# Patient Record
Sex: MALE | Race: WHITE | Employment: FULL TIME | URBAN - METROPOLITAN AREA
[De-identification: names, ages, dates, MRNs, and addresses within clinical notes are randomized per-mention and may not be internally consistent; named-entity substitution may affect disease eponyms.]

---

## 2019-09-27 ENCOUNTER — OFFICE VISIT (OUTPATIENT)
Dept: ORTHOPEDIC SURGERY | Age: 49
End: 2019-09-27
Payer: COMMERCIAL

## 2019-09-27 VITALS — HEIGHT: 70 IN | WEIGHT: 225 LBS | BODY MASS INDEX: 32.21 KG/M2

## 2019-09-27 DIAGNOSIS — M19.031 ARTHRITIS OF WRIST, RIGHT: ICD-10-CM

## 2019-09-27 DIAGNOSIS — M25.531 WRIST PAIN, RIGHT: Primary | ICD-10-CM

## 2019-09-27 PROCEDURE — 20605 DRAIN/INJ JOINT/BURSA W/O US: CPT | Performed by: ORTHOPAEDIC SURGERY

## 2019-09-27 PROCEDURE — L3908 WHO COCK-UP NONMOLDE PRE OTS: HCPCS | Performed by: ORTHOPAEDIC SURGERY

## 2019-09-27 PROCEDURE — 99203 OFFICE O/P NEW LOW 30 MIN: CPT | Performed by: ORTHOPAEDIC SURGERY

## 2019-09-27 RX ORDER — BETAMETHASONE SODIUM PHOSPHATE AND BETAMETHASONE ACETATE 3; 3 MG/ML; MG/ML
12 INJECTION, SUSPENSION INTRA-ARTICULAR; INTRALESIONAL; INTRAMUSCULAR; SOFT TISSUE ONCE
Status: COMPLETED | OUTPATIENT
Start: 2019-09-27 | End: 2019-09-27

## 2019-09-27 RX ADMIN — BETAMETHASONE SODIUM PHOSPHATE AND BETAMETHASONE ACETATE 12 MG: 3; 3 INJECTION, SUSPENSION INTRA-ARTICULAR; INTRALESIONAL; INTRAMUSCULAR; SOFT TISSUE at 09:08

## 2020-08-04 ENCOUNTER — OFFICE VISIT (OUTPATIENT)
Dept: ORTHOPEDIC SURGERY | Age: 50
End: 2020-08-04
Payer: COMMERCIAL

## 2020-08-04 VITALS — HEIGHT: 70 IN | BODY MASS INDEX: 32.93 KG/M2 | RESPIRATION RATE: 12 BRPM | TEMPERATURE: 97.3 F | WEIGHT: 230 LBS

## 2020-08-04 PROCEDURE — 99203 OFFICE O/P NEW LOW 30 MIN: CPT | Performed by: PHYSICIAN ASSISTANT

## 2020-08-04 NOTE — PROGRESS NOTES
Other (Comment)(Sudden movements)  Limiting Behavior: Yes  Relieving Factors: Rest  Result of Injury: Yes(Hit head on side of pool)  Work-Related Injury: No  Are there other pain locations you wish to document?: No      Associated signs and symptoms:   Neurogenic bowel or bladder symptoms:  no   Perceived weakness:  no   Difficulty walking:  no    Recent Imaging (within past one year)   Xrays: no   MRI or CT of spine: no    Current/Past Treatment:   · Physical Therapy:  none  · Chiropractic:  none  · Injection:  none  · Medications:   NSAIDS:  yes   Muscle relaxer:  none   Steriods:  none   Neuropathic medications:  none   Opioids:  none  · Previous surgery:  no  · Previous surgical consult:  no  · Other:  · Infection control  · Tested positive for MRSA in past 12 months:  no  · Tested positive for MSSA \"staph infection\" in past 12 months: no  · Tested positive for VRE (Vancomycin Resistant Enterococci) in past 12 months:   no  · Currently on any antibiotics for an infection: no  · Anticoagulants:  · On a blood thinner:  no   · Any history of bleeding disorder: no   · MRI Contraindication: no   · Previous Pain Management: no   · Goal for treatment: Reduce pain / improve neck motion  · How long can you stand? Varies    Sit? Varies       Walk? Varies      Past medical, surgical, social and family history reviewed with the patient. Past Medical History:   Past Medical History:   Diagnosis Date    Seasonal allergies       Past Surgical History:     Past Surgical History:   Procedure Laterality Date    TONSILLECTOMY      TOOTH EXTRACTION  2010    VARICOSE VEIN SURGERY  2007     Current Medications:     Current Outpatient Medications:     Cetirizine HCl (ZYRTEC PO), Take  by mouth as needed. , Disp: , Rfl:   Allergies:  Patient has no known allergies. Social History:    reports that he has been smoking. He has been smoking about 1.00 pack per day.  He has never used smokeless tobacco. He reports current alcohol use of about 7.0 standard drinks of alcohol per week. He reports that he does not use drugs. Family History:   Family History   Problem Relation Age of Onset    High Blood Pressure Father        REVIEW OF SYSTEMS: ROS - 14 point    Constitutional: No fevers, chills, night sweats, unexplained weight loss  Eye: No vision changes or diplopia  ENT: No nasal congestion, postnasal drip or sore throat. No tinnitus  Respiratory: No cough or SOB  CV: No chest pain or palpitations  GI: No nausea, abdominal pain, stool changes  : No dysuria or hematuria  Skin: No new or changing skin lesions, no rashes  MSK: No joint swelling, morning stiffness, unusual joint pain, + neck pain   Neurological: No headache, confusion, syncope  Psychiatric: No excessive anxiety or depression  Endocrine: No polyuria or polydipsia  Hematologic: No lymph node enlargement or excessive bleeding  Immunologic:No history of immune deficiency or immunomodulating drugs           PHYSICAL EXAM:    Vitals: Temperature 97.3 °F (36.3 °C), resp. rate 12, height 5' 10\" (1.778 m), weight 230 lb (104.3 kg). GENERAL EXAM:  · General Apparence: Patient is adequately groomed with no evidence of malnutrition. · Psychiatric: Orientation: The patient is oriented to time, place and person. The patient's mood and affect are appropriate   · Vascular: Examination reveals no swelling and palpation reveals no tenderness in upper or lower extremities. Good capillary refill. · The lymphatic examination of the neck, axillae and groin reveals all areas to be without enlargement or induration   Sensation is intact without deficit in the upper and lower extremities to light touch and pinprick  · Coordination of the upper and lower extremities are normal.    CERVICAL EXAMINATION:  · Inspection: Local inspection shows no step-off or bruising. Cervical alignment is normal. No instability is noted.   · Palpation and Percussion: No evidence of tenderness at the midline. Paraspinal tenderness is not present. There is no paraspinal spasm. · Range of Motion:  limited by 50% in all planes due to pain   · Strength: 5/5 bilateral upper extremities  · Special Tests:   Spurling's and Silveira's are negative bilaterally. Tejeda and Impingement tests are negative bilaterally. · Skin:There are no rashes, ulcerations or lesions. · Reflexes: Bilaterally triceps, biceps and brachioradialis are 1+. Clonus absent bilaterally at the feet. No pathological reflexes are noted. · Gait & station:  normal, patient ambulates without assistance and no ataxia  · Additional Examinations:  · RIGHT UPPER EXTREMITY:  Inspection/examination of the right upper extremity does not show any tenderness, deformity or injury. Range of motion is normal and pain-free. There is no gross instability. There are no rashes, ulcerations or lesions. Strength and tone are normal. No atrophy or abnormal movements are noted. · LEFT UPPER EXTREMITY: Inspection/examination of the left upper extremity does not show any tenderness, deformity or injury. Range of motion is normal and pain-free. There is no gross instability. There are no rashes, ulcerations or lesions. Strength and tone are normal. No atrophy or abnormal movements are noted. LUMBAR/SACRAL EXAMINATION:  · Inspection: Local inspection shows no step-off or bruising. Lumbar alignment is normal. No instability is noted. · Palpation:   No evidence of tenderness at the midline. Lumbar paraspinal tenderness No tenderness to palpation of the lumbar paraspinals  Bursal tenderness No tenderness bilaterally  There is no paraspinal spasm. · Range of Motion: pain-free ROM  · Strength:   Strength testing is 5/5 in all muscle groups tested. · Special Tests:   Straight leg raise and crossed SLR negative. Jacob's testing is negative bilaterally. FADIR's testing is negative bilaterally. Slump test negative.  Bowstring test negative  · Skin: There are no rashes, ulcerations or lesions. · Reflexes: Reflexes are symmetrically 1+ at the patellar and ankle tendons. Clonus absent bilaterally at the feet. · Gait & station: normal, patient ambulates without assistance and no ataxia  · Additional Examinations:  · RIGHT LOWER EXTREMITY: Inspection/examination of the right lower extremity does not show any tenderness, deformity or injury. Range of motion is unremarkable. There is no gross instability. There are no rashes, ulcerations or lesions. Strength and tone are normal. No atrophy or abnormal movements are noted. · LEFT LOWER EXTREMITY:  Inspection/examination of the left lower extremity does not show any tenderness, deformity or injury. Range of motion is unremarkable. There is no gross instability. There are no rashes, ulcerations or lesions. Strength and tone are normal. No atrophy or abnormal movements are noted. Diagnostic Testing:    Xrays:   AP and lateral of the cervical spine taken today in the office show loss of the normal lordosis of the cervical spine. MRI or CT:  None     EMG:  None     No results found for this or any previous visit. Impression (Medical Decision Making):       1. Cervical sprain, initial encounter    2. Spondylosis without myelopathy or radiculopathy, cervical region    3. Neck pain        Plan (Medical Decision Making):    I discussed the diagnosis and the treatment options with Eulogio Moon today. In Summary:  The various treatment options were outlined and discussed with Eulogio Moon including:  Conservative care options: physical therapy, ice, medications, bracing, and activity modification. The indications for therapeutic injections. The indications for additional imaging/laboratory studies. The indications for (possible future) interventions. After considering the various options discussed, Eulogio Moon elected to pursue a course of treatment that includes the followin.  Medications: Continue [address]  Partner of [facility] ([facility])         This dictation was performed with a verbal recognition program [employer]) and it was checked for errors. It is possible that there are still dictated errors within this office note. If so, please bring any errors to my attention for an addendum. All efforts were made to ensure that this office note is accurate.

## 2020-09-01 ENCOUNTER — OFFICE VISIT (OUTPATIENT)
Dept: ORTHOPEDIC SURGERY | Age: 50
End: 2020-09-01
Payer: COMMERCIAL

## 2020-09-01 VITALS — RESPIRATION RATE: 12 BRPM | HEIGHT: 70 IN | TEMPERATURE: 98.4 F | WEIGHT: 229.94 LBS | BODY MASS INDEX: 32.92 KG/M2

## 2020-09-01 PROCEDURE — 99213 OFFICE O/P EST LOW 20 MIN: CPT | Performed by: PHYSICIAN ASSISTANT

## 2020-09-01 NOTE — PROGRESS NOTES
Follow up: Garnet Eisenmenger  1970  [id number]         Chief Complaint   Patient presents with    Neck Pain     CK CSP HEP         HISTORY OF PRESENT ILLNESS:  Mr. Sherrell Broderick is a 45 y.o. male returns for a follow up visit for multiple medical problems. His current presenting problems are   1. Cervical sprain, subsequent encounter    2. Spondylosis without myelopathy or radiculopathy, cervical region    . As per information/history obtained from the PADT(patient assessment and documentation tool) - He complains of pain in the neck with radiation to the neck He rates the pain 1/10 and describes it as aching, throbbing. Pain is made worse by: weather. He denies side effects from the current pain regimen. Patient reports that since the last follow up visit the physical functioning is better, family/social relationships are better, mood is better and sleep patterns are better, and that the overall functioning is better. Patient denies neurological bowel or bladder. The patient presents today in follow-up after first being seen as a new patient on 8/4/2020 when he was provided with a home exercise program that he has completed over the last 4 weeks. The patient describes that overall he is doing better however on last Friday he describes that he had a particularly bad day but he does equate this to the rain and weather changes. The patient did take ibuprofen that day and describes that it did help with the pain. The patient describes that rest and stretching helps with his pain. The patient will have headaches when the pain comes on but today he feels well. Associated signs and symptoms:   Neurogenic bowel or bladder symptoms:  no   Perceived weakness:  no   Difficulty walking:  no            Past medical, surgical, social and family history reviewed with the patient.      Past Medical History:   Past Medical History:   Diagnosis Date    Seasonal allergies       Past Surgical History:     Past Surgical History:   Procedure Laterality Date    TONSILLECTOMY      TOOTH EXTRACTION  2010    VARICOSE VEIN SURGERY  2007     Current Medications:     Current Outpatient Medications:     Cetirizine HCl (ZYRTEC PO), Take  by mouth as needed. , Disp: , Rfl:   Allergies:  Patient has no known allergies. Social History:    reports that he has been smoking. He has been smoking about 1.00 pack per day. He has never used smokeless tobacco. He reports current alcohol use of about 7.0 standard drinks of alcohol per week. He reports that he does not use drugs. Family History:   Family History   Problem Relation Age of Onset    High Blood Pressure Father        REVIEW OF SYSTEMS:   CONSTITUTIONAL: Denies unexplained weight loss, fevers, chills or fatigue  NEUROLOGICAL: Denies unsteady gait or progressive weakness  MUSCULOSKELETAL: Denies joint swelling or redness  GI: Denies nausea, vomiting, diarrhea   : Denies bowel or bladder issues       PHYSICAL EXAM:    Vitals: Temperature 98.4 °F (36.9 °C), resp. rate 12, height 5' 10\" (1.778 m), weight 229 lb 15 oz (104.3 kg). GENERAL EXAM:  · General Apparence: Patient is adequately groomed with no evidence of malnutrition. · Psychiatric: Orientation: The patient is oriented to time, place and person. The patient's mood and affect are appropriate   · Vascular: Examination reveals no swelling and palpation reveals no tenderness in upper or lower extremities. Good capillary refill. · The lymphatic examination of the neck, axillae and groin reveals all areas to be without enlargement or induration  · Sensation is intact without deficit in the upper and lower extremities to light touch and pinprick  · Coordination of the upper and lower extremities are normal.    CERVICAL EXAMINATION:  · Inspection: Local inspection shows no step-off or bruising. Cervical alignment is normal. No instability is noted. · Palpation and Percussion: No evidence of tenderness at the midline. Paraspinal tenderness is present. There is no paraspinal spasm. Skin:There are no rashes, ulcerations or lesions  · Range of Motion:  limited by 25% in all planes due to pain   · Strength: 5/5 bilateral upper extremities  · Special Tests:   Spurling's and Silveira's are negative bilaterally. Tejeda and Impingement tests are negative bilaterally. · Skin:There are no rashes, ulcerations or lesions in right & left upper extremities. · Reflexes: Bilaterally triceps, biceps and brachioradialis are 1+. Clonus absent bilaterally at the feet. No pathological reflexes are noted. · Gait & station: normal, patient ambulates without assistance and no ataxia  · Additional Examinations:  · RIGHT UPPER EXTREMITY:  Inspection/examination of the right upper extremity does not show any tenderness, deformity or injury. Range of motion is unremarkable and pain-free. There is no gross instability. There are no rashes, ulcerations or lesions. Strength and tone are normal. No atrophy or abnormal movements are noted. · LEFT UPPER EXTREMITY: Inspection/examination of the left upper extremity does not show any tenderness, deformity or injury. Range of motion is unremarkable and pain-free. There is no gross instability. There are no rashes, ulcerations or lesions. Strength and tone are normal. No atrophy or abnormal movements are noted. · RIGHT LOWER EXTREMITY: Inspection/examination of the right lower extremity does not show any tenderness, deformity or injury. Range of motion is normal and pain-free. There is no gross instability. There are no rashes, ulcerations or lesions. Strength and tone are normal. No atrophy or abnormal movements are noted. · LEFT LOWER EXTREMITY:  Inspection/examination of the left lower extremity does not show any tenderness, deformity or injury. Range of motion is normal and pain-free. There is no gross instability. There are no rashes, ulcerations or lesions.   Strength and tone are normal. No atrophy or abnormal movements are noted. Diagnostic Testing:    No new diagnostics. No results found for this or any previous visit. Impression:       1. Cervical sprain, subsequent encounter    2. Spondylosis without myelopathy or radiculopathy, cervical region        Plan:  Clinical Course: Above diagnoses are improving     I discussed the diagnosis and the treatment options with Della Appiah today. In Summary:  The various treatment options were outlined and discussed with Della Appiah including:  Conservative care options: physical therapy, ice, medications, bracing, and activity modification. The indications for therapeutic injections. The indications for additional imaging/laboratory studies. The indications for (possible future) interventions. After considering the various options discussed, Della Appiah elected to pursue a course of treatment that includes the followin. Medications:  Continue anti-inflammatories with appropriate GI Precautions including to stop if develop dark tarry stools or GI upset and to take with food. 2. PT:  Encouraged to continue with Home exercise program. The patient will continue with the HEP that was provided to him at his first office visit. 3. Further studies: No further studies. The patient will call the office if he has continued pain and a MRI of the Cervical Spine will be completed at this time. 4. Interventional:  No further intervention at this time. 5. Follow up:  4-6 weeks      Della Appiah was instructed to call the office if his symptoms worsen or if new symptoms appear prior to the next scheduled visit. He is specifically instructed to contact the office between now & his scheduled appointment if he has concerns related to his condition or if he needs assistance in scheduling the above tests. He is welcome to call for an appointment sooner if he has any additional concerns or questions.      Sal English ATC, am scribing for and in the presence of Chavez Murray PA-C.    09/01/20 2:01 PM Noemi Rahman, MAINOR    The physical examination was performed between the patient and Ambrocio Fong PA-C All counseling during the appointment was performed between the patient and provider. Payal Lentz PA-C, personally performed the services described in this documentation as scribed by MILES Gandhi in my presence and it is both accurate and complete. MEGA Garrison PA-C  Board Certified by the [employer] on Certification of Physician Assistants  Lele [address] 57  Partner of [facility] ([facility])        This dictation was performed with a verbal recognition program [facility]) and it was checked for errors. It is possible that there are still dictated errors within this office note. If so, please bring any errors to my attention for an addendum. All efforts were made to ensure that this office note is accurate.